# Patient Record
Sex: MALE | Race: WHITE | NOT HISPANIC OR LATINO | Employment: FULL TIME | URBAN - METROPOLITAN AREA
[De-identification: names, ages, dates, MRNs, and addresses within clinical notes are randomized per-mention and may not be internally consistent; named-entity substitution may affect disease eponyms.]

---

## 2017-02-28 ENCOUNTER — HOSPITAL ENCOUNTER (EMERGENCY)
Facility: CLINIC | Age: 47
Discharge: HOME | End: 2017-02-28
Attending: PHYSICIAN ASSISTANT | Admitting: PHYSICIAN ASSISTANT
Payer: COMMERCIAL

## 2017-02-28 VITALS
HEART RATE: 80 BPM | BODY MASS INDEX: 32.41 KG/M2 | OXYGEN SATURATION: 96 % | DIASTOLIC BLOOD PRESSURE: 98 MMHG | WEIGHT: 210 LBS | TEMPERATURE: 97.9 F | SYSTOLIC BLOOD PRESSURE: 185 MMHG | RESPIRATION RATE: 16 BRPM

## 2017-02-28 DIAGNOSIS — K13.79 ACUTE ORAL PAIN: ICD-10-CM

## 2017-02-28 PROCEDURE — 99213 OFFICE O/P EST LOW 20 MIN: CPT | Performed by: PHYSICIAN ASSISTANT

## 2017-02-28 PROCEDURE — 99212 OFFICE O/P EST SF 10 MIN: CPT

## 2017-02-28 RX ORDER — DIPHENHYDRAMINE HYDROCHLORIDE AND LIDOCAINE HYDROCHLORIDE AND ALUMINUM HYDROXIDE AND MAGNESIUM HYDRO
5-10 KIT EVERY 6 HOURS PRN
Qty: 237 ML | Refills: 1 | Status: SHIPPED | OUTPATIENT
Start: 2017-02-28 | End: 2021-08-31

## 2017-02-28 NOTE — ED AVS SNAPSHOT
[facility] Emergency Department    [address] [address]    Phone:  [phone]    Fax:  [phone]                                       Joe Rae   MRN: [id number]    Department:  [facility] Emergency Department   Date of Visit:  2/28/2017           Patient Information     Date Of Birth          1970        Your diagnoses for this visit were:     Acute oral pain        You were seen by Adelaida Dickerson PA-C.      Follow-up Information     Follow up with [facility].    Specialty:  ENT    Why:  As needed, If symptoms worsen    Contact information:    [address] [place] [id number] [phone]    Additional information:    The medical center is located at   [address] [place] [address]. (between [address] and   [address] in [place], [place] miles north   of [place]).        Please follow up.    Why:  or with a dentist as soon as possible       24 Hour Appointment Hotline       To make an appointment at any [facility], call [phone] ([phone]). If you don't have a family doctor or clinic, we will help you find one. [facility] are conveniently located to serve the needs of you and your family.             Review of your medicines      START taking        Dose / Directions Last dose taken    FIRST-MOUTHWASH BLM Susp   Dose:  5-10 mL   Quantity:  237 mL        Swish and swallow 5-10 mLs in mouth every 6 hours as needed   Refills:  1          Our records show that you are taking the medicines listed below. If these are incorrect, please call your family doctor or clinic.        Dose / Directions Last dose taken    ACIDOPHILUS PROBIOTIC BLEND Caps   Dose:  1 capsule   Quantity:  60 capsule        Take 1 capsule by mouth 2 times daily   Refills:  0        IBUPROFEN PO   Dose:  200 mg        Take 200 mg by mouth every 6 hours as needed for moderate pain   Refills:  0                Prescriptions were sent or printed at these  locations (1 Prescription)                   [facility], [place] - [address]   [address], [facility] [address]    Telephone:  [phone]   Fax:  [phone]   Hours:                  E-Prescribed (1 of 1)         DPH-Lido-AlHydr-MgHydr-Simeth (FIRST-MOUTHWASH BLM) SUSP                Orders Needing Specimen Collection     None      Pending Results     No orders found from 2/26/2017 to 3/1/2017.            Pending Culture Results     No orders found from 2/26/2017 to 3/1/2017.             Test Results from your hospital stay            Thank you for choosing [facility]       Thank you for choosing [facility] for your care. Our goal is always to provide you with excellent care. Hearing back from our patients is one way we can continue to improve our services. Please take a few minutes to complete the written survey that you may receive in the mail after you visit with us. Thank you!        [employer] Information     [employer] gives you secure access to your electronic health record. If you see a primary care provider, you can also send messages to your care team and make appointments. If you have questions, please call your primary care clinic.  If you do not have a primary care provider, please call [phone] and they will assist you.        Care EveryWhere ID     This is your Care EveryWhere ID. This could be used by other organizations to access your [facility] medical records  [id number]        After Visit Summary       This is your record. Keep this with you and show to your community pharmacist(s) and doctor(s) at your next visit.

## 2017-02-28 NOTE — ED AVS SNAPSHOT
[facility] Emergency Department    [address] [address]    Phone:  [phone]    Fax:  [phone]                                       Joe Rae   MRN: [id number]    Department:  [facility] Emergency Department   Date of Visit:  2/28/2017           After Visit Summary Signature Page     I have received my discharge instructions, and my questions have been answered. I have discussed any challenges I see with this plan with the nurse or doctor.    ..........................................................................................................................................  Patient/Patient Representative Signature      ..........................................................................................................................................  Patient Representative Print Name and Relationship to Patient    ..................................................               ................................................  Date                                            Time    ..........................................................................................................................................  Reviewed by Signature/Title    ...................................................              ..............................................  Date                                                            Time

## 2017-03-01 NOTE — ED NOTES
Patient developed dental pain that has gotten worse throughout the day. He has tried Excedrin to control his pain with normal relief. He does not have a regular dentist.

## 2017-08-19 ENCOUNTER — HOSPITAL ENCOUNTER (EMERGENCY)
Facility: CLINIC | Age: 47
Discharge: HOME | End: 2017-08-20
Attending: EMERGENCY MEDICINE | Admitting: EMERGENCY MEDICINE
Payer: COMMERCIAL

## 2017-08-19 ENCOUNTER — APPOINTMENT (OUTPATIENT)
Dept: GENERAL RADIOLOGY | Facility: CLINIC | Age: 47
End: 2017-08-19
Attending: EMERGENCY MEDICINE
Payer: COMMERCIAL

## 2017-08-19 VITALS
DIASTOLIC BLOOD PRESSURE: 95 MMHG | OXYGEN SATURATION: 95 % | TEMPERATURE: 98.3 F | RESPIRATION RATE: 18 BRPM | WEIGHT: 235 LBS | BODY MASS INDEX: 36.26 KG/M2 | SYSTOLIC BLOOD PRESSURE: 149 MMHG

## 2017-08-19 DIAGNOSIS — M25.562 ACUTE PAIN OF LEFT KNEE: ICD-10-CM
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
## 2017-08-19 DIAGNOSIS — S71.111A LACERATION OF RIGHT THIGH, INITIAL ENCOUNTER: ICD-10-CM

## 2017-08-19 DIAGNOSIS — M25.572 ACUTE LEFT ANKLE PAIN: ICD-10-CM

## 2017-08-19 PROCEDURE — 76604 US EXAM CHEST: CPT | Mod: 26 | Performed by: EMERGENCY MEDICINE

## 2017-08-19 PROCEDURE — 76705 ECHO EXAM OF ABDOMEN: CPT | Mod: 26 | Performed by: EMERGENCY MEDICINE

## 2017-08-19 PROCEDURE — 73610 X-RAY EXAM OF ANKLE: CPT | Mod: LT

## 2017-08-19 PROCEDURE — 76604 US EXAM CHEST: CPT

## 2017-08-19 PROCEDURE — 71010 XR CHEST 1 VW: CPT

## 2017-08-19 PROCEDURE — 99285 EMERGENCY DEPT VISIT HI MDM: CPT | Mod: 25

## 2017-08-19 PROCEDURE — 12032 INTMD RPR S/A/T/EXT 2.6-7.5: CPT | Performed by: EMERGENCY MEDICINE

## 2017-08-19 PROCEDURE — 73560 X-RAY EXAM OF KNEE 1 OR 2: CPT | Mod: LT

## 2017-08-19 PROCEDURE — 99284 EMERGENCY DEPT VISIT MOD MDM: CPT | Mod: 25 | Performed by: EMERGENCY MEDICINE

## 2017-08-19 PROCEDURE — 12032 INTMD RPR S/A/T/EXT 2.6-7.5: CPT

## 2017-08-19 PROCEDURE — 76705 ECHO EXAM OF ABDOMEN: CPT

## 2017-08-19 PROCEDURE — 93308 TTE F-UP OR LMTD: CPT

## 2017-08-19 PROCEDURE — 93308 TTE F-UP OR LMTD: CPT | Mod: 26 | Performed by: EMERGENCY MEDICINE

## 2017-08-19 RX ORDER — BUPIVACAINE HYDROCHLORIDE 5 MG/ML
INJECTION, SOLUTION PERINEURAL
Status: DISCONTINUED
Start: 2017-08-19 | End: 2017-08-20 | Stop reason: HOSPADM

## 2017-08-19 NOTE — ED AVS SNAPSHOT
[facility] Emergency Department    [address] [address]    Phone:  [phone]    Fax:  [phone]                                       Joe Rae   MRN: [id number]    Department:  [facility] Emergency Department   Date of Visit:  8/19/2017           Patient Information     Date Of Birth          1970        Your diagnoses for this visit were:     Fall, initial encounter     Acute left ankle pain     Acute pain of left knee     Laceration of right thigh, initial encounter        You were seen by Tripp Dinh MD.        Discharge Instructions       Please keep the wound clean and dry for 24-48 hours. The affected area should be kept elevated as much as possible. After 24 hours, the dressing may be removed and the wound may be gently cleaned with warm water. You may apply antibiotic ointment as desired. You should return in 7-10 days for suture removal. Please return to the ER or to primary care physician immediately if you develop warmth, redness, swelling, drainage from the wound or have fevers.   Any time the skin is damaged, scar formation is unavoidable. You can minimize the scar by following the instructions listed above, and by preventing infection. The scar will continue to change for at least 1 year, and the final appearance of the scar will not be known until at least that time. One way to minimize scar formation is to apply sun screen to the scar before any sun exposure for 12 months following wound repair, as sunburn or even tanning may cause the scar to become discolored.    If still having significant pain in one week, return to the emergency department or clinic for a recheck    24 Hour Appointment Hotline       To make an appointment at any [facility] clinic, call [phone] ([phone]). If you don't have a family doctor or clinic, we will help you find one. [facility] clinics are conveniently located to serve the needs of you and your  family.             Review of your medicines      START taking        Dose / Directions Last dose taken    cephALEXin 500 MG capsule   Commonly known as:  KEFLEX   Dose:  500 mg   Quantity:  28 capsule        Take 1 capsule (500 mg) by mouth 4 times daily for 7 days   Refills:  0          Our records show that you are taking the medicines listed below. If these are incorrect, please call your family doctor or clinic.        Dose / Directions Last dose taken    ACIDOPHILUS PROBIOTIC BLEND Caps   Dose:  1 capsule   Quantity:  60 capsule        Take 1 capsule by mouth 2 times daily   Refills:  0        IBUPROFEN PO   Dose:  200 mg        Take 200 mg by mouth every 6 hours as needed for moderate pain   Refills:  0        lidocaine visc 2% & diphenhydramine 12.5mg/5mL & maalox/mylanta w/simethicone (1:1:1 v/v/v) Susp compounding kit   Dose:  5-10 mL   Quantity:  237 mL        Swish and swallow 5-10 mLs in mouth every 6 hours as needed   Refills:  1                Prescriptions were sent or printed at these locations (1 Prescription)                   [facility] [address] [place] [address] [address]    Telephone:  [phone]   Fax:  [phone]   Hours:                  E-Prescribed (1 of 1)         cephALEXin (KEFLEX) 500 MG capsule                Procedures and tests performed during your visit     Ankle XR, G/E 3 views, left    Knee XR, 2 views, left    Laceration repair    POC US ABDOMEN LIMITED    XR Chest 1 View      Orders Needing Specimen Collection     None      Pending Results     Date and Time Order Name Status Description    8/19/2017 2237 XR Chest 1 View Preliminary     8/19/2017 2236 Ankle XR, G/E 3 views, left Preliminary     8/19/2017 2236 Knee XR, 2 views, left Preliminary             Pending Culture Results     No orders found for last 3 day(s).            Pending Results Instructions     If you had any lab results that were not finalized at the  time of your Discharge, you can call the ED Lab Result RN at [phone]. You will be contacted by this team for any positive Lab results or changes in treatment. The nurses are available 7 days a week from 10A to 6:30P.  You can leave a message 24 hours per day and they will return your call.        Test Results From Your Hospital Stay        8/19/2017 10:44 PM      Impression     [facility] Procedure Note      FAST (Focused Assessment with Sonography for Trauma):    PROCEDURE: PERFORMED BY: Dr. Tripp Dinh  INDICATIONS/SYMPTOM:  Abdominal Pain  PROBE: Cardiac phased array probe  BODY LOCATION: The ultrasound was performed in the abdominal, subxiphoid and chest areas.  FINDINGS: No evidence of free fluid in hepatorenal (Morison s pouch), perisplenic, or and pelvic areas. No evidence of pericardial effusion.   Extended FAST exam (eFAST):   Images of both lung hemithoracies taken in 2D in multiple rib spaces        Right side:  Lung sliding artifact  Present     Comet tail artifacts  Present   Left side:  Lung sliding artifact  Present     Comet tail artifacts  Present   Hemothorax: Right side Absent     Left side Absent  INTERPRETATION: The FAST exam was normal. There was no free fluid present. There was no pericardial effusion. and No evidence of pneumothorax or hemothorax  IMAGE DOCUMENTATION: Images were archived to PACs system.           8/19/2017 11:24 PM      Narrative     LEFT KNEE 2 VIEWS   8/19/2017 11:14 PM     HISTORY: Fall from tree stand. Knee pain.    COMPARISON: None.        Impression     IMPRESSION:   1. No visualized acute fracture or malalignment of the left knee.  2. No left knee joint effusion.         8/19/2017 11:23 PM      Narrative     LEFT ANKLE 3 VIEWS   8/19/2017 11:14 PM     HISTORY: Fall from tree stand. Pain worse over the lateral malleolus.    COMPARISON: None.        Impression     IMPRESSION: No visualized acute fracture or malalignment of the left  ankle.          8/19/2017 11:24 PM      Narrative     CHEST SINGLE VIEW   8/19/2017 11:14 PM     HISTORY: Fall from tree stand.    COMPARISON: 11/5/2013.    FINDINGS: Bandlike opacities in the midlungs bilaterally are again  noted and likely represent scarring. The lungs otherwise are clear.  Normal-sized cardiac silhouette.        Impression     IMPRESSION: No radiographic evidence of acute trauma in the chest.                Thank you for choosing [facility]       Thank you for choosing [facility] for your care. Our goal is always to provide you with excellent care. Hearing back from our patients is one way we can continue to improve our services. Please take a few minutes to complete the written survey that you may receive in the mail after you visit with us. Thank you!        [email or web address] Information     [employer] gives you secure access to your electronic health record. If you see a primary care provider, you can also send messages to your care team and make appointments. If you have questions, please call your primary care clinic.  If you do not have a primary care provider, please call [phone] and they will assist you.        Care EveryWhere ID     This is your Care EveryWhere ID. This could be used by other organizations to access your [facility] medical records  [id number]        Equal Access to Services     ELAINE COOK : Hadii reanna Benson, shalini madison, kamar phipps, josselin lozada. So [facility] [phone].    ATENCIÓN: Si habla español, tiene a umanzor disposición servicios gratuitos de asistencia lingüística. Llame al [phone].    We comply with applicable federal civil rights laws and [place] laws. We do not discriminate on the basis of race, color, national origin, age, disability sex, sexual orientation or gender identity.            After Visit Summary       This is your record. Keep this with you and show to your community pharmacist(s) and doctor(s) at your next  visit.

## 2017-08-19 NOTE — ED AVS SNAPSHOT
[facility] Emergency Department    [address] [address]    Phone:  [phone]    Fax:  [phone]                                       Joe Rae   MRN: [id number]    Department:  [facility] Emergency Department   Date of Visit:  8/19/2017           After Visit Summary Signature Page     I have received my discharge instructions, and my questions have been answered. I have discussed any challenges I see with this plan with the nurse or doctor.    ..........................................................................................................................................  Patient/Patient Representative Signature      ..........................................................................................................................................  Patient Representative Print Name and Relationship to Patient    ..................................................               ................................................  Date                                            Time    ..........................................................................................................................................  Reviewed by Signature/Title    ...................................................              ..............................................  Date                                                            Time

## 2017-08-20 RX ORDER — CEPHALEXIN 500 MG/1
500 CAPSULE ORAL 4 TIMES DAILY
Qty: 28 CAPSULE | Refills: 0 | Status: SHIPPED | OUTPATIENT
Start: 2017-08-20 | End: 2017-08-27

## 2017-08-20 NOTE — ED NOTES
Pt fell out of a tree stand 12 ft- 5 hrs ago. Unsure of how he landed. He states he tucked and rolled. Denies LOC. Complains of Rt elbow pain, Rt leg pain. Has a Cut on the back of his rt thigh. Not on blood thinners. He is alert and oriented on arrival and was able to weight bear from WC  With assistance. Denies chest pain. Denies back pain. States stiff neck but mild. Abd- pt vague about pain there. Has a 2 inch laceration lower posterior thigh.

## 2017-08-20 NOTE — DISCHARGE INSTRUCTIONS
Please keep the wound clean and dry for 24-48 hours. The affected area should be kept elevated as much as possible. After 24 hours, the dressing may be removed and the wound may be gently cleaned with warm water. You may apply antibiotic ointment as desired. You should return in 7-10 days for suture removal. Please return to the ER or to primary care physician immediately if you develop warmth, redness, swelling, drainage from the wound or have fevers.   Any time the skin is damaged, scar formation is unavoidable. You can minimize the scar by following the instructions listed above, and by preventing infection. The scar will continue to change for at least 1 year, and the final appearance of the scar will not be known until at least that time. One way to minimize scar formation is to apply sun screen to the scar before any sun exposure for 12 months following wound repair, as sunburn or even tanning may cause the scar to become discolored.    If still having significant pain in one week, return to the emergency department or clinic for a recheck

## 2017-08-20 NOTE — ED PROVIDER NOTES
History     Chief Complaint   Patient presents with     Fall     pt jumped out of tree stand to avoid getting stung by wasps approximately 12 feet up.  pt c/o abdominal pain, left foot pain,  laceration behind right thigh.       HPI  Joe Rae is a 46 year old male who presents for evaluation after fall.  Fall happened about 6 hours prior to arrival, he was in a deer stand and jump from the deer stand to avoid stung by wasps.  At 1st he had so much pain in his left leg he could not walk, that has improved some and he has been able to ambulate at home.  A friend gave him some ibuprofen to take for the pain he feels helped.  He thinks he landed on his left leg.  Pain is worse in the left ankle and left knee, moderate in severity, aching, nonradiating.  He denies hitting his head or loss of consciousness.  No headache, drainage from the ears or nose, neck pain, chest pain, shortness of breath, vomiting, or focal numbness and weakness.  He does have some slight lower abdominal pain, denies pelvic pain.    Past medical history: Previously healthy  Daily medications  No known drug allergies  Current every day smoker    I have reviewed the Medications, Allergies, Past Medical and Surgical History, and Social History in the Epic system.         Review of Systems  Pertinent positives and negatives listed in the HPI, all other systems reviewed and are negative.    Physical Exam   BP: (!) 149/95  Heart Rate: 98  Temp: 98.3  F (36.8  C)  Resp: 18  Weight: 106.6 kg (235 lb)  SpO2: 95 %  Physical Exam  BP (!) 149/95  Temp 98.3  F (36.8  C) (Oral)  Resp 18  Wt 106.6 kg (235 lb)  SpO2 95%  BMI 36.26 kg/m2   GENERAL: Alert, cooperative, mild distress  HEAD: No scalp laceration, hematoma, or abrasion.  No tenderness.  EYES: Conjunctivae clear, EOM intact. PERLL, Pupils are 3 mm.  HEENT:  Normal external ears, normal TM's without evidence of hemotympanum.  No nasal discharge or evidence of septal hematoma. No facial  instability or asymmetry. No evidence of intraoral trauma.  Intact bite without malalignment.  NECK: Full painless range of motion.  No midline tenderness, no lateral tenderness.  CHEST:  No crepitus or tenderness with AP or lateral compression  CARDIOVASCULAR : Nml rate, distal pulses are normal and symmetric  LUNGS: No respiratory distress.  GI: Soft, ND, NT.   PELVIS: No crepitus or tenderness with AP or lateral compression  BACK: No step-offs palpated, no tenderness to palpation of thoracic or lumbar spine.  EXTREMITIES: No obvious deformities, tenderness to palpation of right ankle lateral malleolus with slight ecchymosis.  No calcaneal tenderness bilaterally.  Slight tenderness globally of the left knee.  NEUROLOGICAL : GCS= 15.  Awake, alert, and oriented x 3.  Cranial nerves II-XII grossly intact. Normal muscle strength and tone, sensation to light touch grossly intact in all extremities.  No focal deficits.   SKIN : Normal color, no jaundice or rash. Laceration of the posterior right thigh.      ED Course     ED Course     Laceration repair  Date/Time: 8/20/2017 12:21 AM  Performed by: JAYLENE NORMAN  Authorized by: JAYLENE NORMAN   Consent: Verbal consent obtained.  Consent given by: patient  Patient identity confirmed: verbally with patient  Body area: lower extremity  Location details: right upper leg  Laceration length: 3.4 cm  Foreign bodies: no foreign bodies  Tendon involvement: none  Nerve involvement: none  Vascular damage: no  Anesthesia: local infiltration    Anesthesia:  Local Anesthetic: bupivacaine 0.5% without epinephrine  Preparation: Patient was prepped and draped in the usual sterile fashion.  Irrigation solution: tap water  Irrigation method: tap  Amount of cleaning: extensive  Debridement: none  Degree of undermining: none  Skin closure: 3-0 nylon  Number of sutures: 4  Technique: simple  Approximation: close  Approximation difficulty: simple  Dressing: antibiotic ointment  and gauze roll  Patient tolerance: Patient tolerated the procedure well with no immediate complications        Results for orders placed during the hospital encounter of 08/19/17   POC US ABDOMEN LIMITED    Impression [facility] Procedure Note      FAST (Focused Assessment with Sonography for Trauma):    PROCEDURE: PERFORMED BY: Dr. Tripp Dinh  INDICATIONS/SYMPTOM:  Abdominal Pain  PROBE: Cardiac phased array probe  BODY LOCATION: The ultrasound was performed in the abdominal, subxiphoid and chest areas.  FINDINGS: No evidence of free fluid in hepatorenal (Morison s pouch), perisplenic, or and pelvic areas. No evidence of pericardial effusion.   Extended FAST exam (eFAST):   Images of both lung hemithoracies taken in 2D in multiple rib spaces        Right side:  Lung sliding artifact  Present     Comet tail artifacts  Present   Left side:  Lung sliding artifact  Present     Comet tail artifacts  Present   Hemothorax: Right side Absent     Left side Absent  INTERPRETATION: The FAST exam was normal. There was no free fluid present. There was no pericardial effusion. and No evidence of pneumothorax or hemothorax  IMAGE DOCUMENTATION: Images were archived to PACs system.               Critical Care time:  none               Labs Ordered and Resulted from Time of ED Arrival Up to the Time of Departure from the ED - No data to display    Assessments & Plan (with Medical Decision Making)   46-year-old male presents for evaluation after fall.  Differential includes fracture, disposition, soft tissue injury.  Blood pressure 149/95, heart rate 98, respiratory 18, SPO2 95% on room air, temperature 36.8 C. EFAST negative for intraperitoneal free fluid, pericardial effusion, or pneumothorax.  Multiple images obtained including chest x-ray, left ankle x-ray, and left knee x-ray.  All images reviewed independently as well as radiology report reviewed, no signs of fracture dislocation.  The patient's thigh  laceration is anesthetized, irrigated, and explored.  No foreign body in the wound is superficial, therefore low risk of retained plant-based bacterial.  The laceration is repaired as above and he will be started on cephalexin for prophylaxis.  He is discharged to home with instructions to return if he has signs of wound infection, otherwise follow-up in 7-10 days for recheck and stitches removal.  The patient is in agreement with this plan.    I have reviewed the nursing notes.    I have reviewed the findings, diagnosis, plan and need for follow up with the patient.       New Prescriptions    CEPHALEXIN (KEFLEX) 500 MG CAPSULE    Take 1 capsule (500 mg) by mouth 4 times daily for 7 days       Final diagnoses:   Fall, initial encounter   Acute left ankle pain   Acute pain of left knee   Laceration of right thigh, initial encounter       8/19/2017   [facility] EMERGENCY DEPARTMENT     Tripp Dinh MD  08/20/17 0022

## 2017-08-30 ENCOUNTER — ALLIED HEALTH/NURSE VISIT (OUTPATIENT)
Dept: FAMILY MEDICINE | Facility: CLINIC | Age: 47
End: 2017-08-30
Payer: COMMERCIAL

## 2017-08-30 DIAGNOSIS — Z48.02 ENCOUNTER FOR REMOVAL OF SUTURES: Primary | ICD-10-CM

## 2017-08-30 PROCEDURE — 99207 ZZC NO CHARGE NURSE ONLY: CPT

## 2017-08-30 NOTE — NURSING NOTE
Patient is here for suture removal from the right posterior thigh.  Seen in Ed on 8/19/17 had 4 stitches applied told to remove in 7-10 days.  It is 11 days today and the area is well healed and scabbed over.  4 stitches are removed with too much difficulty as they are well embedded in the large scabbed area.  No bleeding noted and patient tolerated the procedure well.  Bruising that is old is noted (brown area the size of a basket ball).  S and S of infection and blood clots are gone over and the need to be seen if experiencing this. Rissa MCGHEE RN

## 2017-08-30 NOTE — MR AVS SNAPSHOT
After Visit Summary   8/30/2017    Joe Rae    MRN: [id number]           Patient Information     Date Of Birth          1970        Visit Information        Provider Department      8/30/2017 3:45 PM KALLIE AVALOS/ANNA MARIE OWENS [facility]        Today's Diagnoses     Encounter for removal of sutures    -  1       Follow-ups after your visit        Who to contact     If you have questions or need follow up information about today's clinic visit or your schedule please contact [facility] directly at [phone].  Normal or non-critical lab and imaging results will be communicated to you by Seeonichart, letter or phone within 4 business days after the clinic has received the results. If you do not hear from us within 7 days, please contact the clinic through Rentabil[employer] or phone. If you have a critical or abnormal lab result, we will notify you by phone as soon as possible.  Submit refill requests through [employer] or call your pharmacy and they will forward the refill request to us. Please allow 3 business days for your refill to be completed.          Additional Information About Your Visit        MyChart Information     [employer] gives you secure access to your electronic health record. If you see a primary care provider, you can also send messages to your care team and make appointments. If you have questions, please call your primary care clinic.  If you do not have a primary care provider, please call [phone] and they will assist you.        Care EveryWhere ID     This is your Care EveryWhere ID. This could be used by other organizations to access your [facility] medical records  [id number]         Blood Pressure from Last 3 Encounters:   08/19/17 (!) 149/95   02/28/17 (!) 185/98   06/14/16 121/84    Weight from Last 3 Encounters:   08/19/17 235 lb (106.6 kg)   02/28/17 210 lb (95.3 kg)   06/14/16 237 lb (107.5 kg)              Today, you had the following     No orders found  for display       Primary Care Provider    Physician No Ref-Primary       No address on file        Equal Access to Services     ELAINE COOK : Hadii aad ku hadarnulfotone Sanamali, serafinjacqueline murpyhparvizha, kamar gavinpbjacqueline pillaijarrodjosselin phillipskristynvincent lozada. So [facility] [phone].    ATENCIÓN: Si habla español, tiene a umanzor disposición servicios gratuitos de asistencia lingüística. Llame al [phone].    We comply with applicable federal civil rights laws and [place] laws. We do not discriminate on the basis of race, color, national origin, age, disability sex, sexual orientation or gender identity.            Thank you!     Thank you for choosing [facility]  for your care. Our goal is always to provide you with excellent care. Hearing back from our patients is one way we can continue to improve our services. Please take a few minutes to complete the written survey that you may receive in the mail after your visit with us. Thank you!             Your Updated Medication List - Protect others around you: Learn how to safely use, store and throw away your medicines at www.disposemymeds.org.          This list is accurate as of: 8/30/17  4:31 PM.  Always use your most recent med list.                   Brand Name Dispense Instructions for use Diagnosis    ACIDOPHILUS PROBIOTIC BLEND Caps     60 capsule    Take 1 capsule by mouth 2 times daily    Cellulitis and abscess of leg       IBUPROFEN PO      Take 200 mg by mouth every 6 hours as needed for moderate pain        lidocaine visc 2% & diphenhydramine 12.5mg/5mL & maalox/mylanta w/simethicone (1:1:1 v/v/v) Susp compounding kit     237 mL    Swish and swallow 5-10 mLs in mouth every 6 hours as needed

## 2021-08-31 ENCOUNTER — APPOINTMENT (OUTPATIENT)
Dept: GENERAL RADIOLOGY | Facility: CLINIC | Age: 51
End: 2021-08-31
Attending: EMERGENCY MEDICINE
Payer: COMMERCIAL

## 2021-08-31 ENCOUNTER — HOSPITAL ENCOUNTER (EMERGENCY)
Facility: CLINIC | Age: 51
Discharge: HOME | End: 2021-08-31
Attending: EMERGENCY MEDICINE | Admitting: EMERGENCY MEDICINE
Payer: COMMERCIAL

## 2021-08-31 ENCOUNTER — ALLIED HEALTH/NURSE VISIT (OUTPATIENT)
Dept: FAMILY MEDICINE | Facility: CLINIC | Age: 51
End: 2021-08-31
Payer: COMMERCIAL

## 2021-08-31 VITALS
HEART RATE: 64 BPM | BODY MASS INDEX: 39.52 KG/M2 | OXYGEN SATURATION: 96 % | WEIGHT: 251.8 LBS | DIASTOLIC BLOOD PRESSURE: 93 MMHG | TEMPERATURE: 98.2 F | HEIGHT: 67 IN | SYSTOLIC BLOOD PRESSURE: 168 MMHG | RESPIRATION RATE: 9 BRPM

## 2021-08-31 DIAGNOSIS — Z13.220 LIPID SCREENING: ICD-10-CM

## 2021-08-31 DIAGNOSIS — R07.9 CHEST PAIN, UNSPECIFIED TYPE: ICD-10-CM

## 2021-08-31 DIAGNOSIS — R07.9 CHEST PAIN: Primary | ICD-10-CM

## 2021-08-31 DIAGNOSIS — R03.0 ELEVATED BLOOD-PRESSURE READING WITHOUT DIAGNOSIS OF HYPERTENSION: ICD-10-CM

## 2021-08-31 LAB
ANION GAP SERPL CALCULATED.3IONS-SCNC: 3 MMOL/L (ref 3–14)
BASOPHILS # BLD AUTO: 0 10E3/UL (ref 0–0.2)
BASOPHILS NFR BLD AUTO: 0 %
BUN SERPL-MCNC: 13 MG/DL (ref 7–30)
CALCIUM SERPL-MCNC: 8.1 MG/DL (ref 8.5–10.1)
CHLORIDE BLD-SCNC: 111 MMOL/L (ref 94–109)
CO2 SERPL-SCNC: 27 MMOL/L (ref 20–32)
CREAT SERPL-MCNC: 0.88 MG/DL (ref 0.66–1.25)
EOSINOPHIL # BLD AUTO: 0.1 10E3/UL (ref 0–0.7)
EOSINOPHIL NFR BLD AUTO: 2 %
ERYTHROCYTE [DISTWIDTH] IN BLOOD BY AUTOMATED COUNT: 13.6 % (ref 10–15)
GFR SERPL CREATININE-BSD FRML MDRD: >90 ML/MIN/1.73M2
GLUCOSE BLD-MCNC: 107 MG/DL (ref 70–99)
HCT VFR BLD AUTO: 44.4 % (ref 40–53)
HGB BLD-MCNC: 14.2 G/DL (ref 13.3–17.7)
HOLD SPECIMEN: NORMAL
IMM GRANULOCYTES # BLD: 0 10E3/UL
IMM GRANULOCYTES NFR BLD: 0 %
LYMPHOCYTES # BLD AUTO: 1.6 10E3/UL (ref 0.8–5.3)
LYMPHOCYTES NFR BLD AUTO: 22 %
MCH RBC QN AUTO: 30.6 PG (ref 26.5–33)
MCHC RBC AUTO-ENTMCNC: 32 G/DL (ref 31.5–36.5)
MCV RBC AUTO: 96 FL (ref 78–100)
MONOCYTES # BLD AUTO: 0.5 10E3/UL (ref 0–1.3)
MONOCYTES NFR BLD AUTO: 7 %
NEUTROPHILS # BLD AUTO: 4.9 10E3/UL (ref 1.6–8.3)
NEUTROPHILS NFR BLD AUTO: 69 %
NRBC # BLD AUTO: 0 10E3/UL
NRBC BLD AUTO-RTO: 0 /100
PLATELET # BLD AUTO: 184 10E3/UL (ref 150–450)
POTASSIUM BLD-SCNC: 4.2 MMOL/L (ref 3.4–5.3)
RBC # BLD AUTO: 4.64 10E6/UL (ref 4.4–5.9)
SODIUM SERPL-SCNC: 141 MMOL/L (ref 133–144)
TROPONIN I SERPL-MCNC: <0.015 UG/L (ref 0–0.04)
WBC # BLD AUTO: 7.1 10E3/UL (ref 4–11)

## 2021-08-31 PROCEDURE — 80061 LIPID PANEL: CPT

## 2021-08-31 PROCEDURE — 85025 COMPLETE CBC W/AUTO DIFF WBC: CPT | Performed by: EMERGENCY MEDICINE

## 2021-08-31 PROCEDURE — 93308 TTE F-UP OR LMTD: CPT | Mod: 26 | Performed by: EMERGENCY MEDICINE

## 2021-08-31 PROCEDURE — 71046 X-RAY EXAM CHEST 2 VIEWS: CPT

## 2021-08-31 PROCEDURE — 93005 ELECTROCARDIOGRAM TRACING: CPT | Performed by: EMERGENCY MEDICINE

## 2021-08-31 PROCEDURE — 99285 EMERGENCY DEPT VISIT HI MDM: CPT | Mod: 25 | Performed by: EMERGENCY MEDICINE

## 2021-08-31 PROCEDURE — 93308 TTE F-UP OR LMTD: CPT | Performed by: EMERGENCY MEDICINE

## 2021-08-31 PROCEDURE — 36415 COLL VENOUS BLD VENIPUNCTURE: CPT | Performed by: EMERGENCY MEDICINE

## 2021-08-31 PROCEDURE — 80048 BASIC METABOLIC PNL TOTAL CA: CPT | Performed by: EMERGENCY MEDICINE

## 2021-08-31 PROCEDURE — 99207 PR NO CHARGE NURSE ONLY: CPT

## 2021-08-31 PROCEDURE — 93010 ELECTROCARDIOGRAM REPORT: CPT | Performed by: EMERGENCY MEDICINE

## 2021-08-31 PROCEDURE — 84484 ASSAY OF TROPONIN QUANT: CPT | Performed by: EMERGENCY MEDICINE

## 2021-08-31 ASSESSMENT — MIFFLIN-ST. JEOR: SCORE: 1950.79

## 2021-08-31 NOTE — ED PROVIDER NOTES
History     Chief Complaint   Patient presents with     Chest Pain     started at 9 pm last night     HPI  Joe Rae is a 51 year old male with history of tobacco use, obesity, positive family history of heart disease, with chest tightness which occurred at approximately 9 PM last evening (11 hours prior to arrival).  Pain occurred while at rest.  He had trouble sleeping and finding a position of comfort.  The symptoms lasted all night.  He has not identified anything that makes it better or worse.  Pain is located centrally in his chest.  Nonradiating.  No known history of coronary artery disease.  No fevers, chills, nausea, vomiting or diaphoresis.  No cough or shortness of breath.  No abdominal pain.  Was in his usual state of health yesterday.    Patient does report occasional GERD and took a Pepcid with no improvement.  During the night reason difficulty sleeping he took several Rolaids and a glass of water again with no resolution of his symptoms.    The patient's PMHx, Surgical Hx, Allergies, and Medications were all reviewed with the patient.    Allergies:  Allergies   Allergen Reactions     Nkda [No Known Drug Allergies]        Problem List:    Patient Active Problem List    Diagnosis Date Noted     CARDIOVASCULAR SCREENING; LDL GOAL LESS THAN 130 08/18/2015     Priority: Medium        Past Medical History:    No past medical history on file.    Past Surgical History:    Past Surgical History:   Procedure Laterality Date     HERNIORRHAPHY UMBILICAL  3/14/2012    Procedure:HERNIORRHAPHY UMBILICAL; Umbilical Hernia Repair; Surgeon:RUPA العلي; Location:[place] OR     ORTHOPEDIC SURGERY         Family History:    No family history on file.    Social History:  Marital Status:   [2]  Social History     Tobacco Use     Smoking status: Current Every Day Smoker     Packs/day: 1.00     Years: 20.00     Pack years: 20.00   Substance Use Topics     Alcohol use: No     Drug use: No        Medications:  "   OVER-THE-COUNTER          Review of Systems   A complete review of systems performed and is otherwise negative except as noted in the HPI.     Physical Exam   BP: (!) 169/92  Pulse: 73  Temp: 98.2  F (36.8  C)  Resp: 20  Height: 170.2 cm (5' 7\")  Weight: 114.2 kg (251 lb 12.8 oz)  SpO2: 97 %    Physical Exam  GEN: Awake, alert, and cooperative. No acute distress.  Comfortably in semirecumbent position.  HENT: MMM. External ears and nose normal bilaterally.  EYES: EOM intact. Conjunctiva clear. No discharge.   NECK: Symmetric, freely mobile.   CV : Regular rate and rhythm.  No cardiac murmurs appreciated.  PULM: Normal effort. No wheezes, rales, or rhonchi bilaterally.  ABD: Soft, non-tender, non-distended. No rebound or guarding.   NEURO: Normal speech. Following commands. CN II-XII grossly intact. Answering questions and interacting appropriately.   EXT: No gross deformity.  No pitting pedal or pretibial edema.  INT: Warm. No diaphoresis. Normal color.        ED Course        Procedures  Results for orders placed during the hospital encounter of 08/31/21    POC US ECHO LIMITED    [facility] Procedure Note    Limited Bedside ED Cardiac Ultrasound:    PROCEDURE: PERFORMED BY: Dr. Kendall Tang MD  INDICATIONS/SYMPTOM:  Chest Pain  PROBE: Cardiac phased array probe  BODY LOCATION: Chest  FINDINGS:  The ultrasound was performed utilizing the subcostal, parasternal long axis, parasternal short axis and apical 4 chamber views.  Cardiac contractility:  Present  Gross estimation of cardiac kinesis: normal  Pericardial Effusion:  None  RV:LV ratio: LV > RV  Positive sliding signs and A-line predominance in apices bilaterally.    INTERPRETATION:    Chamber size and motion were grossly normal with LV > RV, normal cardiac kinesis.  No pericardial effusion was found.  IMAGE DOCUMENTATION: Images were archived to PACs system.          EKG: Interpreted by myself.  Sinus rhythm with rate of 73 bpm.  " Leftward axis.  Delayed R wave progression.  Normal intervals.  No ectopy.  No ST elevations or depressions.  No significant changes compared to 11/5/2013.    Critical Care time:  none               Results for orders placed or performed during the hospital encounter of 08/31/21 (from the past 24 hour(s))   Christmas Draw    Narrative    The following orders were created for panel order Christmas Draw.  Procedure                               Abnormality         Status                     ---------                               -----------         ------                     Extra Blue Top Tube[[id number]]                              Final result               Extra Red Top Tube[[id number]]                               Final result               Extra Green Top (Lithium...[[id number]]                      Final result               Extra Green Top (Lithium...[[id number]]                      Final result               Extra Purple Top Tube[[id number]]                            Final result                 Please view results for these tests on the individual orders.   Extra Blue Top Tube   Result Value Ref Range    Hold Specimen JIC    Extra Red Top Tube   Result Value Ref Range    Hold Specimen JIC    Extra Green Top (Lithium Heparin) Tube   Result Value Ref Range    Hold Specimen JIC    Extra Green Top (Lithium Heparin) Tube   Result Value Ref Range    Hold Specimen JIC    Extra Purple Top Tube   Result Value Ref Range    Hold Specimen JIC    POC US ECHO LIMITED    [facility] Procedure Note      Limited Bedside ED Cardiac Ultrasound:    PROCEDURE: PERFORMED BY: Dr. Kendall Tang MD  INDICATIONS/SYMPTOM:  Chest Pain  PROBE: Cardiac phased array probe  BODY LOCATION: Chest  FINDINGS:   The ultrasound was performed utilizing the subcostal, parasternal long axis, parasternal short axis and apical 4 chamber views.  Cardiac contractility:  Present  Gross estimation of cardiac kinesis:  normal  Pericardial Effusion:  None  RV:LV ratio: LV > RV  Positive sliding signs and A-line predominance in apices bilaterally.    INTERPRETATION:    Chamber size and motion were grossly normal with LV > RV, normal cardiac kinesis.  No pericardial effusion was found.    IMAGE DOCUMENTATION: Images were archived to PACs system.         CBC with platelets differential    Narrative    The following orders were created for panel order CBC with platelets differential.  Procedure                               Abnormality         Status                     ---------                               -----------         ------                     CBC with platelets and d...[[id number]]                      Final result                 Please view results for these tests on the individual orders.   Basic metabolic panel   Result Value Ref Range    Sodium 141 133 - 144 mmol/L    Potassium 4.2 3.4 - 5.3 mmol/L    Chloride 111 (H) 94 - 109 mmol/L    Carbon Dioxide (CO2) 27 20 - 32 mmol/L    Anion Gap 3 3 - 14 mmol/L    Urea Nitrogen 13 7 - 30 mg/dL    Creatinine 0.88 0.66 - 1.25 mg/dL    Calcium 8.1 (L) 8.5 - 10.1 mg/dL    Glucose 107 (H) 70 - 99 mg/dL    GFR Estimate >90 >60 mL/min/1.73m2   Troponin I   Result Value Ref Range    Troponin I <0.015 0.000 - 0.045 ug/L   CBC with platelets and differential   Result Value Ref Range    WBC Count 7.1 4.0 - 11.0 10e3/uL    RBC Count 4.64 4.40 - 5.90 10e6/uL    Hemoglobin 14.2 13.3 - 17.7 g/dL    Hematocrit 44.4 40.0 - 53.0 %    MCV 96 78 - 100 fL    MCH 30.6 26.5 - 33.0 pg    MCHC 32.0 31.5 - 36.5 g/dL    RDW 13.6 10.0 - 15.0 %    Platelet Count 184 150 - 450 10e3/uL    % Neutrophils 69 %    % Lymphocytes 22 %    % Monocytes 7 %    % Eosinophils 2 %    % Basophils 0 %    % Immature Granulocytes 0 %    NRBCs per 100 WBC 0 <1 /100    Absolute Neutrophils 4.9 1.6 - 8.3 10e3/uL    Absolute Lymphocytes 1.6 0.8 - 5.3 10e3/uL    Absolute Monocytes 0.5 0.0 - 1.3 10e3/uL    Absolute Eosinophils  0.1 0.0 - 0.7 10e3/uL    Absolute Basophils 0.0 0.0 - 0.2 10e3/uL    Absolute Immature Granulocytes 0.0 <=0.0 10e3/uL    Absolute NRBCs 0.0 10e3/uL   Chest XR,  PA & LAT    Narrative    CHEST TWO VIEWS 8/31/2021 9:48 AM     HISTORY: central chest pain. no fever or cough    COMPARISON: Chest x-ray 8/19/2017       Impression    IMPRESSION: The cardiac silhouette and pulmonary vasculature are  within normal limits. No focal pulmonary consolidations. Linear  scarring left lower lung. No pleural effusion or pneumothorax.    TK VU MD         SYSTEM ID:  [id number]       Medications - No data to display    Assessments & Plan (with Medical Decision Making)   51 year old male with cardiac risk factors of family history, obesity, tobacco use, with nonexertional chest pain not associated with diaphoresis or dyspnea which occurred 11 hours ago while at rest.  Symptoms improved upon arrival to emergency department.  ECG with normal sinus rhythm poor R wave progression unchanged from ECG from 2013.  Point-of-care ultrasound with grossly preserved LV function, no pericardial effusion, no signs of pneumothorax or significant pulmonary edema.  Cardiac troponin below detection.  Given that chest pain was ongoing and started 11 hours ago would expect a rise in troponin if ACS was underlying etiology.  Chest x-ray without acute infiltrate, effusion, pneumothorax.  Images reviewed personally as well as report from radiology which is noted above.  CBC normal.  BMP grossly normal.  Patient's blood pressure elevated on the emergency department however no formal diagnosis of hypertension.  This should be followed up on in clinic.  Patient has not established primary care so a referral for ED follow-up as well as establish care was placed.  Patient or stand to be contacted by clinic to schedule an appointment.  I discussed the importance of good primary care particularly once you are over 50 years old.  ED return precautions  discussed.  Patient expressed agreement understanding of plan and was discharged in stable condition.    I have reviewed the nursing notes.         HEART Score  Background  Calculates the overall risk of adverse event in patient's presenting with chest pain.  Based on 5 criteria (each assigned 0-2 points) including suspiciousness of history, EKG, age, risk factors and troponin.    Data  51 year old male  has CARDIOVASCULAR SCREENING; LDL GOAL LESS THAN 130 on their problem list.   reports that he has been smoking. He has a 20.00 pack-year smoking history. He does not have any smokeless tobacco history on file.  family history is not on file.  No results found for: TROPI  Criteria   0-2 points for each of 5 items (maximum of 10 points):  Score 0- History slightly suspicious for coronary syndrome  Score 0- EKG Normal  Score 1- Age 45 to 65 years old  Score 2- Three or more risk factors for or history of atherosclerotic disease  Score 0- Within normal limits for troponin levels  Interpretation  Risk of adverse outcome  Heart Score: 3  Total Score 0-3- Adverse Outcome Risk 2.5% - Supports early discharge with appropriate follow-up          New Prescriptions    No medications on file       Final diagnoses:   Chest pain, unspecified type   Elevated blood-pressure reading without diagnosis of hypertension     Kendall Tang MD    8/31/2021   [facility] EMERGENCY DEPT    Disclaimer: This note consists of words and symbols derived from keyboarding and dictation using voice recognition software.  As a result, there may be errors that have gone undetected.  Please consider this when interpreting information found in this note.             Kendall Tang MD  08/31/21 0519

## 2021-08-31 NOTE — PROGRESS NOTES
S-(situation): CP    B-(background): started at 9 p.m. with mid sternum CP.Right arm pain started on Saturday.  Still bothers him some today.  Tried some pepcid and rolaids but not helping.  Slightly SOA.  Denies nausea and diaphoresis.  Has GERD.  + Family hx of heart disease.  Father  of heart attack. Patient has not been here to our clinic in 7 years. Patient very emotional crying.  States this is go to place when needed.    A-(assessment): CP    R-(recommendations): to the ER  /97, P 76, Sats 96%.  Rissa MCGHEE RN

## 2021-08-31 NOTE — DISCHARGE INSTRUCTIONS
Your evaluation in emergency department was reassuring however no definitive cause of your chest discomfort was identified.    A referral for primary care follow-up in [facility] has been placed.  You will be contacted to schedule an appointment.    In the meantime if you have worsening chest pain, difficulty breathing, cold sweats, or other new symptoms you are concerning, you return to emergency department immediately for further evaluation and treatment.

## 2021-08-31 NOTE — ED TRIAGE NOTES
Chest pain started last night, worse when lies down, unsure if could just be an injury from lifting windows the other day as right arm was hurting after that, denies illness, states pain was a 10/10 last night, did try antiacids without relief

## 2021-09-01 ENCOUNTER — OFFICE VISIT (OUTPATIENT)
Dept: FAMILY MEDICINE | Facility: CLINIC | Age: 51
End: 2021-09-01
Payer: COMMERCIAL

## 2021-09-01 ENCOUNTER — NURSE TRIAGE (OUTPATIENT)
Dept: NURSING | Facility: CLINIC | Age: 51
End: 2021-09-01

## 2021-09-01 VITALS
HEIGHT: 67 IN | DIASTOLIC BLOOD PRESSURE: 88 MMHG | RESPIRATION RATE: 16 BRPM | HEART RATE: 68 BPM | SYSTOLIC BLOOD PRESSURE: 168 MMHG | WEIGHT: 252 LBS | TEMPERATURE: 97.8 F | OXYGEN SATURATION: 96 % | BODY MASS INDEX: 39.55 KG/M2

## 2021-09-01 DIAGNOSIS — Z13.220 LIPID SCREENING: ICD-10-CM

## 2021-09-01 DIAGNOSIS — R03.0 ELEVATED BLOOD PRESSURE READING WITHOUT DIAGNOSIS OF HYPERTENSION: ICD-10-CM

## 2021-09-01 DIAGNOSIS — R07.9 CHEST PAIN, UNSPECIFIED TYPE: Primary | ICD-10-CM

## 2021-09-01 DIAGNOSIS — K21.9 GASTROESOPHAGEAL REFLUX DISEASE, UNSPECIFIED WHETHER ESOPHAGITIS PRESENT: ICD-10-CM

## 2021-09-01 LAB
CHOLEST SERPL-MCNC: 145 MG/DL
HDLC SERPL-MCNC: 43 MG/DL
LDLC SERPL CALC-MCNC: 76 MG/DL
NONHDLC SERPL-MCNC: 102 MG/DL
TRIGL SERPL-MCNC: 132 MG/DL

## 2021-09-01 PROCEDURE — 99204 OFFICE O/P NEW MOD 45 MIN: CPT | Performed by: FAMILY MEDICINE

## 2021-09-01 RX ORDER — FAMOTIDINE 20 MG/1
20 TABLET, FILM COATED ORAL 2 TIMES DAILY PRN
COMMUNITY

## 2021-09-01 RX ORDER — OMEPRAZOLE 40 MG/1
40 CAPSULE, DELAYED RELEASE ORAL DAILY
Qty: 30 CAPSULE | Refills: 3 | Status: SHIPPED | OUTPATIENT
Start: 2021-09-01 | End: 2021-10-27

## 2021-09-01 ASSESSMENT — MIFFLIN-ST. JEOR: SCORE: 1951.69

## 2021-09-01 NOTE — PROGRESS NOTES
"    Assessment & Plan      In addition to problems below I also recommended that he establish primary care and have a full physical.  He has elevated blood pressure today and in the ER.  Possible that that is related to his pain, however, I would like to see this improve once his acute symptoms resolve and if not we discussed he may need to start blood pressure medication.  I did add on a cholesterol panel to the ER blood specimens.  Discussed that he is at an age where I would recommend he be seen regularly for health screening.  Needs colon cancer screening.  He will plan to schedule this within the next month.    Chest pain, unspecified type  ER work-up including cardiac enzymes, troponin, chest x-ray normal.  Symptoms consistent with GERD and possible esophageal spasm.  See below.  - lidocaine (XYLOCAINE) 2 % 15 mL, alum & mag hydroxide-simethicone (MAALOX) 15 mL GI Cocktail    Gastroesophageal reflux disease, unspecified whether esophagitis present  He did have some improvement with GI cocktail.  Has a longstanding history of mild intermittent reflux.  Suspect recent flare of reflux along with esophageal spasm.  Started on omeprazole.  His symptoms are persistent he is to let me know for further work-up and evaluation including possible EGD.  - lidocaine (XYLOCAINE) 2 % 15 mL, alum & mag hydroxide-simethicone (MAALOX) 15 mL GI Cocktail  - omeprazole (PRILOSEC) 40 MG DR capsule; Take 1 capsule (40 mg) by mouth daily    Elevated blood pressure reading without diagnosis of hypertension  Follow-up for physical and blood pressure re-check.    Lipid screening  - Lipid panel reflex to direct LDL Fasting; Future             Tobacco Cessation:   reports that he has been smoking. He has a 20.00 pack-year smoking history. He has never used smokeless tobacco.      BMI:   Estimated body mass index is 39.47 kg/m  as calculated from the following:    Height as of this encounter: 1.702 m (5' 7\").    Weight as of this " "encounter: 114.3 kg (252 lb).           No follow-ups on file.    Ana Suresh MD  [facility]    Nettie Diaz is a 51 year old who presents for the following health issues  accompanied by his spouse:    HPI     ED/UC Followup:    Facility:  [facility] ED  Date of visit: 8/31/2021  Reason for visit: Chest Pain, Elevated BP  Current Status: Pain worsened again this AM       Started Mon night. Intense substernal chest pain.  Worse in the morning.  Improves throughout the day.  Was seen in the ER yesterday.  Work-up included cardiac enzymes, EKG, chest x-ray.  All normal.  He has a history of reflux.  Typically famotidine has worked for him.  That was minimally helpful for this.    Review of Systems   Constitutional, neuro, ENT, endocrine, pulmonary, cardiac, gastrointestinal, genitourinary, musculoskeletal, integument and psychiatric systems are negative, except as otherwise noted.       Objective    BP (!) 168/88   Pulse 68   Temp 97.8  F (36.6  C) (Tympanic)   Resp 16   Ht 1.702 m (5' 7\")   Wt 114.3 kg (252 lb)   SpO2 96%   BMI 39.47 kg/m    Body mass index is 39.47 kg/m .  Physical Exam   GENERAL: Pleasant, well appearing male.  CV: RRR, no murmurs, rubs or gallops.  LUNGS: CTAB, normal effort.  ABDOMEN: Soft, nondistended, nontender, no hepatosplenomegaly. No palpable masses.                  "

## 2021-09-01 NOTE — NURSING NOTE
"Initial BP (!) 168/88   Pulse 68   Temp 97.8  F (36.6  C) (Tympanic)   Resp 16   Ht 1.702 m (5' 7\")   Wt 114.3 kg (252 lb)   SpO2 96%   BMI 39.47 kg/m   Estimated body mass index is 39.47 kg/m  as calculated from the following:    Height as of this encounter: 1.702 m (5' 7\").    Weight as of this encounter: 114.3 kg (252 lb). .      "

## 2021-09-01 NOTE — PATIENT INSTRUCTIONS
Your BMI is Body mass index is Body mass index is 39.47 kg/m .     A BMI of 18.5 to 24.9 is in the healthy range. A person with a BMI of 25 to 29.9 is considered overweight, and someone with a BMI of 30 or greater is considered obese. More than two-thirds of American adults are considered overweight or obese.  Weight management is a personal decision.  If you are interested in exploring weight loss strategies, the following discussion covers the approaches that may be successful. Body mass index (BMI) is one way to tell whether you are at a healthy weight, overweight, or obese. It measures your weight in relation to your height.  Being overweight or obese increases the risk for further weight gain. Excess weight may lead to heart disease and diabetes.  Creating and following plans for healthy eating and physical activity may help you improve your health.  Weight control is part of healthy lifestyle and includes exercise, emotional health, and healthy eating habits. Careful eating habits lifelong are the mainstay of weight control. Though there are significant health benefits from weight loss, long-term weight loss with diet alone may be very difficult to achieve- studies show long-term success with dietary management alone in less than 10% of people. Attaining a healthy weight may be especially difficult to achieve in those with severe obesity. In some cases, medications, devices and surgical management might be considered.  What can you do?    Keep a food journal to help with mindful eating and finding ways to modify your diet.      Reduce the amount of processed food in your diet. Focus on adding vegetables, and lean proteins.    Reduce dietary carbohydrates. Limiting to  gm of carbohydrates per day has been shows to help boost weight loss.  If you have diabetes or are on diabetic medications do not do this without talking to your physician or healthcare provider.    Diet combined with exercise helps  maintain muscle while optimizing fat loss. Strength training is particularly important for building and maintaining muscle mass. Exercise helps reduce stress, increase energy, and improves fitness. Increasing exercise without diet control, however, may not burn enough calories to loose weight.         Start walking three days a week 10-20 minutes at a time    Work towards walking thirty minutes five days a week      Eventually, increase the speed of your walking for 1-2 minutes at time    In addition, we recommend that you review healthy lifestyles and methods for weight loss available through the National Institutes of Health patient information sites:  http://win.niddk.nih.gov/publications/index.htm    Also look into health and wellness programs that may be available through your health insurance provider, employer, local community center, or chase club.

## 2021-09-01 NOTE — TELEPHONE ENCOUNTER
Today Joe is having a tightness and pain in upper chest and was into ED yesterday and basically ruled out heart, valves and pneumonia and blood clot.  This morning just laying down could not find any spot that did not hurt and took two aspirin and edge of pain is gone.  Chest pain seems to be present more and when sitting in recliner it feels better.  Chest pain started on Monday August 30th.  Patient is requesting to schedule in person clinic visit.    COVID 19 Nurse Triage Plan/Patient Instructions    Please be aware that novel coronavirus (COVID-19) may be circulating in the community. If you develop symptoms such as fever, cough, or SOB or if you have concerns about the presence of another infection including coronavirus (COVID-19), please contact your health care provider or visit [email or web address].     Disposition/Instructions    In-Person Visit with provider recommended. Reference Visit Selection Guide.    Thank you for taking steps to prevent the spread of this virus.  o Limit your contact with others.  o Wear a simple mask to cover your cough.  o Wash your hands well and often.    Resources    [facility]: About COVID-19: [email or web address]    CDC: What to Do If You're Sick: www.cdc.gov/coronavirus/2019-ncov/about/steps-when-sick.html    CDC: Ending Home Isolation: www.cdc.gov/coronavirus/2019-ncov/hcp/disposition-in-home-patients.html     CDC: Caring for Someone: www.cdc.gov/coronavirus/2019-ncov/if-you-are-sick/care-for-someone.html     [facility]: Interim Guidance for Hospital Discharge to Home: www.health.[facility].mn.us/diseases/coronavirus/hcp/hospdischarge.pdf    [facility] clinical trials (COVID-19 research studies): [email or web address]-trials     Below are the COVID-19 hotlines at the [facility] ([facility]). Interpreters are available.   o For health questions: Call 364-354-6370 or 1-833.656.9760 (7 a.m. to 7 p.m.)  o For questions  "about schools and childcare: Call 452-528-6178 or 1-869.156.9941 (7 a.m. to 7 p.m.)                       Reason for Disposition    [1] Chest pain lasts > 5 minutes AND [2] occurred > 3 days ago (72 hours) AND [3] NO chest pain or cardiac symptoms now    Additional Information    Negative: Severe difficulty breathing (e.g., struggling for each breath, speaks in single words)    Negative: Difficult to awaken or acting confused (e.g., disoriented, slurred speech)    Negative: Shock suspected (e.g., cold/pale/clammy skin, too weak to stand, low BP, rapid pulse)    Negative: [1] Chest pain lasts > 5 minutes AND [2] history of heart disease  (i.e., heart attack, bypass surgery, angina, angioplasty, CHF; not just a heart murmur)    Negative: [1] Chest pain lasts > 5 minutes AND [2] described as crushing, pressure-like, or heavy    Negative: [1] Chest pain lasts > 5 minutes AND [2] age > 50    Negative: [1] Chest pain lasts > 5 minutes AND [2] age > 30 AND [3] at least one cardiac risk factor (i.e., hypertension, diabetes, obesity, smoker or strong family history of heart disease)    Negative: [1] Chest pain lasts > 5 minutes AND [2] not relieved with nitroglycerin    Negative: Passed out (i.e., lost consciousness, collapsed and was not responding)    Negative: Heart beating < 50 beats per minute OR > 140 beats per minute    Negative: Visible sweat on face or sweat dripping down face    Negative: Sounds like a life-threatening emergency to the triager    Negative: SEVERE chest pain    Negative: [1] Intermittent  chest pain or \"angina\" AND [2] increasing in severity or frequency  (Exception: pains lasting a few seconds)    Negative: Pain also present in shoulder(s) or arm(s) or jaw  (Exception: pain is clearly made worse by movement)    Negative: Difficulty breathing    Negative: Dizziness or lightheadedness    Negative: Coughing up blood    Negative: Cocaine use within last 3 days    Negative: History of prior \"blood clot\" " in leg or lungs (i.e., deep vein thrombosis, pulmonary embolism)    Negative: Recent illness requiring prolonged bedrest (i.e., immobilization)    Negative: Hip or leg fracture in past 2 months (e.g., had cast on leg or ankle)    Negative: Major surgery in the past month    Negative: Recent long-distance travel with prolonged time in car, bus, plane, or train (i.e., within past 2 weeks; 6 or  more hours duration)    Negative: Chest pain lasts > 5 minutes (Exceptions: chest pain occurring > 3 days ago and now asymptomatic; same as previously diagnosed heartburn and has accompanying sour taste in mouth)    Negative: Taking a deep breath makes pain worse    Negative: Patient sounds very sick or weak to the triager    Protocols used: CHEST PAIN-A-AH

## 2021-10-26 DIAGNOSIS — K21.9 GASTROESOPHAGEAL REFLUX DISEASE, UNSPECIFIED WHETHER ESOPHAGITIS PRESENT: ICD-10-CM

## 2021-10-27 RX ORDER — OMEPRAZOLE 40 MG/1
CAPSULE, DELAYED RELEASE ORAL
Qty: 90 CAPSULE | Refills: 1 | Status: SHIPPED | OUTPATIENT
Start: 2021-10-27

## 2023-03-14 ENCOUNTER — NURSE TRIAGE (OUTPATIENT)
Dept: NURSING | Facility: CLINIC | Age: 53
End: 2023-03-14
Payer: COMMERCIAL

## 2023-03-14 ENCOUNTER — HOSPITAL ENCOUNTER (EMERGENCY)
Facility: CLINIC | Age: 53
Discharge: LEFT BEFORE DISCHARGE | End: 2023-03-14
Attending: EMERGENCY MEDICINE
Payer: COMMERCIAL

## 2023-03-14 VITALS
HEART RATE: 83 BPM | WEIGHT: 220 LBS | SYSTOLIC BLOOD PRESSURE: 183 MMHG | OXYGEN SATURATION: 95 % | DIASTOLIC BLOOD PRESSURE: 78 MMHG | RESPIRATION RATE: 14 BRPM | BODY MASS INDEX: 34.46 KG/M2 | TEMPERATURE: 98 F

## 2023-03-14 ASSESSMENT — ACTIVITIES OF DAILY LIVING (ADL): ADLS_ACUITY_SCORE: 33

## 2023-03-14 NOTE — ED NOTES
BP (!) 183/78   Pulse 83   Temp 98  F (36.7  C) (Tympanic)   Resp 14   Wt 99.8 kg (220 lb)   SpO2 95%   BMI 34.46 kg/m      Joe Rae is a 52-year-old male presenting with complaints of left flank pain, dysuria, and difficulties urinating since 6 AM this morning.  Patient has history of kidney stones and states these symptoms feel similar.  Given patient's history, I recommended he be evaluated in the emergency department.  We discussed that he will likely require further testing and/or treatment beyond the capabilities of the current urgent care setting including labs and potential imaging.  Patient expresses understanding of this and agreement with the plan.     Cheryl Proctor PA-C  03/14/23 1411

## 2023-03-14 NOTE — TELEPHONE ENCOUNTER
Left flank pain since 6 AM. Unable to find a comfortable position. Has dysuria, urinates about only 1 tbsp per hour. Strong urge to urinate.    FNA advised ED and increase fluid intake once he is discharged from ED.    Pt plans to head to [facility] first. FNA encouraged ED as  does not have equipment to treat and diagnose him. Pt verbalized understanding.        Bruna Funez RN/[facility] Nurse Advisor        Reason for Disposition    SEVERE pain (e.g., excruciating, scale 8-10) and present > 1 hour    Additional Information    Negative: Passed out (i.e., lost consciousness, collapsed and was not responding)    Negative: Shock suspected (e.g., cold/pale/clammy skin, too weak to stand, low BP, rapid pulse)    Negative: Sounds like a life-threatening emergency to the triager    Negative: Followed a major injury to the back (e.g., MVA, fall > 10 feet or 3 meters, penetrating injury, etc.)    Negative: Upper, mid or lower back pain that occurs mainly in the midline    Protocols used: FLANK PAIN-A-[place]

## 2023-03-14 NOTE — ED TRIAGE NOTES
See triage note below, flank pain  Marina Kaiser RN    Left flank pain since 6 AM. Unable to find a comfortable position. Has dysuria, urinates about only 1 tbsp per hour. Strong urge to urinate.     FNA advised ED and increase fluid intake once he is discharged from ED.     Pt plans to head to [facility] first. FNA encouraged ED as  does not have equipment to treat and diagnose him. Pt verbalized understanding.         Triage Assessment     Row Name 03/14/23 3795       Triage Assessment (Adult)    Airway WDL WDL       Respiratory WDL    Respiratory WDL WDL       Cognitive/Neuro/Behavioral WDL    Cognitive/Neuro/Behavioral WDL WDL

## 2024-02-06 ENCOUNTER — HOSPITAL ENCOUNTER (EMERGENCY)
Facility: CLINIC | Age: 54
Discharge: HOME | End: 2024-02-06
Attending: STUDENT IN AN ORGANIZED HEALTH CARE EDUCATION/TRAINING PROGRAM | Admitting: STUDENT IN AN ORGANIZED HEALTH CARE EDUCATION/TRAINING PROGRAM
Payer: COMMERCIAL

## 2024-02-06 VITALS
SYSTOLIC BLOOD PRESSURE: 174 MMHG | OXYGEN SATURATION: 95 % | RESPIRATION RATE: 16 BRPM | TEMPERATURE: 98.9 F | HEART RATE: 100 BPM | DIASTOLIC BLOOD PRESSURE: 89 MMHG

## 2024-02-06 DIAGNOSIS — S61.211A LACERATION OF LEFT INDEX FINGER WITHOUT FOREIGN BODY WITHOUT DAMAGE TO NAIL, INITIAL ENCOUNTER: ICD-10-CM

## 2024-02-06 PROCEDURE — 12001 RPR S/N/AX/GEN/TRNK 2.5CM/<: CPT | Performed by: STUDENT IN AN ORGANIZED HEALTH CARE EDUCATION/TRAINING PROGRAM

## 2024-02-06 PROCEDURE — 90715 TDAP VACCINE 7 YRS/> IM: CPT | Performed by: STUDENT IN AN ORGANIZED HEALTH CARE EDUCATION/TRAINING PROGRAM

## 2024-02-06 PROCEDURE — G0463 HOSPITAL OUTPT CLINIC VISIT: HCPCS | Mod: 25 | Performed by: STUDENT IN AN ORGANIZED HEALTH CARE EDUCATION/TRAINING PROGRAM

## 2024-02-06 PROCEDURE — 99213 OFFICE O/P EST LOW 20 MIN: CPT | Mod: 25 | Performed by: STUDENT IN AN ORGANIZED HEALTH CARE EDUCATION/TRAINING PROGRAM

## 2024-02-06 PROCEDURE — 90471 IMMUNIZATION ADMIN: CPT | Performed by: STUDENT IN AN ORGANIZED HEALTH CARE EDUCATION/TRAINING PROGRAM

## 2024-02-06 PROCEDURE — 250N000011 HC RX IP 250 OP 636: Performed by: STUDENT IN AN ORGANIZED HEALTH CARE EDUCATION/TRAINING PROGRAM

## 2024-02-06 RX ORDER — LIDOCAINE HYDROCHLORIDE 10 MG/ML
10 INJECTION, SOLUTION INFILTRATION; PERINEURAL ONCE
Status: DISCONTINUED | OUTPATIENT
Start: 2024-02-06 | End: 2024-02-06 | Stop reason: HOSPADM

## 2024-02-06 RX ADMIN — CLOSTRIDIUM TETANI TOXOID ANTIGEN (FORMALDEHYDE INACTIVATED), CORYNEBACTERIUM DIPHTHERIAE TOXOID ANTIGEN (FORMALDEHYDE INACTIVATED), BORDETELLA PERTUSSIS TOXOID ANTIGEN (GLUTARALDEHYDE INACTIVATED), BORDETELLA PERTUSSIS FILAMENTOUS HEMAGGLUTININ ANTIGEN (FORMALDEHYDE INACTIVATED), BORDETELLA PERTUSSIS PERTACTIN ANTIGEN, AND BORDETELLA PERTUSSIS FIMBRIAE 2/3 ANTIGEN 0.5 ML: 5; 2; 2.5; 5; 3; 5 INJECTION, SUSPENSION INTRAMUSCULAR at 16:52

## 2024-02-06 NOTE — ED TRIAGE NOTES
Pt reports laceration to the left pointer finger from utility knife. Incident happened today     Pt declines taking any blood thinning medications     Most recent tdap on file per [facility] record 2012

## 2024-02-06 NOTE — DISCHARGE INSTRUCTIONS
You sustained a cut/laceration to the left index finger.  Since this happened over the knuckle we placed 3 stitches to the site to help keep the wound closed.  These will need to come out in 7 to 10 days.  In the meantime you can wash the area 3 times daily with a soapy rag and apply topical antibiotic cream after each cleaning.  I would avoid bending the finger is much as possible so that the stitches remain in place.  Follow-up with your primary care doctor for recheck and to have the stitches removed.  Come back sooner for any new or acutely worsening symptoms, particularly any signs of infection.

## 2024-02-06 NOTE — ED PROVIDER NOTES
History     Chief Complaint   Patient presents with    Laceration     HPI  Joe Rae is a 53 year old male with history of hypertension who presents for evaluation of a finger laceration.  Patient was using a  about 30 minutes prior to presentation when he accidentally cut the back of his left index finger.  This caused a small laceration over the PIP joint.  He still has some minimal bleeding to the area despite applying pressure.  Reports normal range of motion to the finger and no injury to the nail.  He is right-handed.  The knife was clean, only used for cardboard; he denies concern for foreign body.  Last tetanus shot was in 2012.  Patient denies anticoagulant use.  No other injuries or complaints today.    Allergies:  Allergies   Allergen Reactions    Nkda [No Known Drug Allergy]        Problem List:    Patient Active Problem List    Diagnosis Date Noted    Elevated blood pressure reading without diagnosis of hypertension 09/01/2021     Priority: Medium    Gastroesophageal reflux disease, unspecified whether esophagitis present 09/01/2021     Priority: Medium    CARDIOVASCULAR SCREENING; LDL GOAL LESS THAN 130 08/18/2015     Priority: Medium        Past Medical History:    No past medical history on file.    Past Surgical History:    Past Surgical History:   Procedure Laterality Date    HERNIORRHAPHY UMBILICAL  3/14/2012    Procedure:HERNIORRHAPHY UMBILICAL; Umbilical Hernia Repair; Surgeon:RUPA العلي; Location:[facility] OR    ORTHOPEDIC SURGERY         Family History:    No family history on file.    Social History:  Marital Status:   [2]  Social History     Tobacco Use    Smoking status: Every Day     Packs/day: 1.00     Years: 20.00     Additional pack years: 0.00     Total pack years: 20.00     Types: Cigarettes    Smokeless tobacco: Never   Substance Use Topics    Alcohol use: No    Drug use: No        Medications:    famotidine (PEPCID) 20 MG tablet  omeprazole (PRILOSEC) 40  MG DR capsule  OVER-THE-COUNTER      Review of Systems   All other systems reviewed and are negative.  See HPI.    Physical Exam   BP: (!) 182/96  Pulse: 100  Temp: 98.9  F (37.2  C)  Resp: 16  SpO2: 95 %      Physical Exam  Vitals and nursing note reviewed.   Constitutional:       General: He is not in acute distress.     Appearance: Normal appearance. He is not ill-appearing or toxic-appearing.   HENT:      Head: Atraumatic.   Eyes:      General: No scleral icterus.     Conjunctiva/sclera: Conjunctivae normal.   Cardiovascular:      Rate and Rhythm: Normal rate and regular rhythm.      Pulses: Normal pulses.      Heart sounds: Normal heart sounds.   Pulmonary:      Effort: Pulmonary effort is normal. No respiratory distress.      Breath sounds: Normal breath sounds.   Abdominal:      Palpations: Abdomen is soft.      Tenderness: There is no abdominal tenderness.   Musculoskeletal:         General: Tenderness and signs of injury present. No deformity. Normal range of motion.      Cervical back: Neck supple.      Comments: Small superficial linear laceration to the dorsal left index finger overlying the PIP joint as described below.  Range of motion to the finger is entirely normal, including full flexion and extension.  No evidence of tendon injury.  Very low suspicion for underlying fracture as well, as he has no bony tenderness and based off of mechanism of injury.   Skin:     General: Skin is warm.      Capillary Refill: Capillary refill takes less than 2 seconds.      Comments: There is a 1 cm horizontal laceration overlying the left second PIP joint.  Minimal to moderate amount of gaping is present with full flexion of the joint.  No obvious tendon or bony injury present.  Minimal oozing still present with full flexion.  Capillary refill is brisk.  Sensation intact.   Neurological:      General: No focal deficit present.      Mental Status: He is alert.      Sensory: No sensory deficit.      Motor: No  weakness.         ED Course                 [facility]    -Laceration Repair    Date/Time: 2/6/2024 5:53 PM    Performed by: Jose R Pastor MD  Authorized by: Jose R Pastor MD    Risks, benefits and alternatives discussed.      ANESTHESIA (see MAR for exact dosages):     Anesthesia method:  Nerve block    Block location:  Left index finger    Block needle gauge:  27 G    Block anesthetic:  Lidocaine 1% w/o epi    Block technique:  For nerve digital block with dorsal approach    Block injection procedure:  Anatomic landmarks identified, introduced needle, incremental injection, anatomic landmarks palpated and negative aspiration for blood    Block outcome:  Anesthesia achieved    LACERATION DETAILS     Location:  Finger    Finger location:  L index finger    Length (cm):  1    Depth (mm):  2    REPAIR TYPE:     Repair type:  Simple    EXPLORATION:     Hemostasis achieved with:  Direct pressure    Wound exploration: wound explored through full range of motion and entire depth of wound probed and visualized      Wound extent: no foreign body, no signs of injury, no nerve damage and no underlying fracture      Contaminated: no      TREATMENT:     Area cleansed with:  Saline    Amount of cleaning:  Standard    Irrigation solution:  Sterile saline    Irrigation volume:  250cc    Irrigation method:  Syringe    SKIN REPAIR     Repair method:  Sutures    Suture size:  4-0    Suture material:  Nylon    Suture technique:  Simple interrupted    Number of sutures:  3    APPROXIMATION     Approximation:  Close    POST-PROCEDURE DETAILS     Dressing:  Non-adherent dressing      PROCEDURE    Patient Tolerance:  Patient tolerated the procedure well with no immediate complications                  No results found for this or any previous visit (from the past 24 hour(s)).    Medications   lidocaine 1 % injection 10 mL (has no administration in time range)   Tdap (tetanus-diphtheria-acell  pertussis) (ADACEL) injection 0.5 mL (0.5 mLs Intramuscular $Given 2/6/24 0708)       Assessments & Plan (with Medical Decision Making)     I have reviewed the nursing notes.    I have reviewed the findings, diagnosis, plan and need for follow up with the patient.  Medical Decision Making  Joe Rae is a 53 year old male with history of hypertension who presents for evaluation of a finger laceration.  Hypertensive and borderline tachycardic on arrival, though also slightly anxious in appearance.  He has a 1 cm horizontally oriented superficial laceration to the left index finger overlying the dorsal DIP joint.  With the finger extended, this is very well-approximated.  However, with flexion of the DIP joint, there is some gaping of the wound.  No visible tendon or neurovascular injury.  Range of motion and distal capillary refill of the finger are entirely normal.  The wound was irrigated extensively with saline.  Adequate anesthesia was provided with a digital block as described above.  3 stitches were placed without complication.  At this point I do not think he warrants empiric antibiotics, but we did discuss wound/suture care and signs of infection.  Patient will follow-up with his PCP for wound check, suture removal, and agrees to return sooner for any new or acutely worsening symptoms.    Discharge Medication List as of 2/6/2024  4:44 PM          Final diagnoses:   Laceration of left index finger without foreign body without damage to nail, initial encounter       2/6/2024   [facility] EMERGENCY DEPT       Jose R Pastor MD  02/06/24 6344